# Patient Record
Sex: FEMALE | Race: BLACK OR AFRICAN AMERICAN | Employment: UNEMPLOYED | URBAN - METROPOLITAN AREA
[De-identification: names, ages, dates, MRNs, and addresses within clinical notes are randomized per-mention and may not be internally consistent; named-entity substitution may affect disease eponyms.]

---

## 2023-01-31 ENCOUNTER — APPOINTMENT (OUTPATIENT)
Dept: GENERAL RADIOLOGY | Age: 38
End: 2023-01-31

## 2023-01-31 ENCOUNTER — HOSPITAL ENCOUNTER (EMERGENCY)
Age: 38
Discharge: HOME | End: 2023-01-31
Attending: EMERGENCY MEDICINE

## 2023-01-31 VITALS
DIASTOLIC BLOOD PRESSURE: 79 MMHG | OXYGEN SATURATION: 100 % | SYSTOLIC BLOOD PRESSURE: 112 MMHG | WEIGHT: 168 LBS | TEMPERATURE: 98.1 F | HEART RATE: 78 BPM | RESPIRATION RATE: 25 BRPM

## 2023-01-31 DIAGNOSIS — R07.89 CHEST WALL PAIN: Primary | ICD-10-CM

## 2023-01-31 DIAGNOSIS — E87.6 HYPOKALEMIA: ICD-10-CM

## 2023-01-31 DIAGNOSIS — M62.838 SPASM OF MUSCLE: ICD-10-CM

## 2023-01-31 LAB
ALBUMIN SERPL-MCNC: 3.5 G/DL (ref 3.5–5)
ALBUMIN/GLOB SERPL: 0.9 (ref 0.4–1.6)
ALP SERPL-CCNC: 61 U/L (ref 50–130)
ALT SERPL-CCNC: 29 U/L (ref 12–65)
ANION GAP SERPL CALC-SCNC: 4 MMOL/L (ref 2–11)
AST SERPL-CCNC: 19 U/L (ref 15–37)
BILIRUB SERPL-MCNC: 0.3 MG/DL (ref 0.2–1.1)
BUN SERPL-MCNC: 17 MG/DL (ref 6–23)
CALCIUM SERPL-MCNC: 9.3 MG/DL (ref 8.3–10.4)
CHLORIDE SERPL-SCNC: 106 MMOL/L (ref 101–110)
CO2 SERPL-SCNC: 27 MMOL/L (ref 21–32)
CREAT SERPL-MCNC: 0.69 MG/DL (ref 0.6–1)
ERYTHROCYTE [DISTWIDTH] IN BLOOD BY AUTOMATED COUNT: 12.7 % (ref 11.9–14.6)
GLOBULIN SER CALC-MCNC: 3.9 G/DL (ref 2.8–4.5)
GLUCOSE SERPL-MCNC: 97 MG/DL (ref 65–100)
HCT VFR BLD AUTO: 41.7 % (ref 35.8–46.3)
HGB BLD-MCNC: 13.9 G/DL (ref 11.7–15.4)
MCH RBC QN AUTO: 28.8 PG (ref 26.1–32.9)
MCHC RBC AUTO-ENTMCNC: 33.3 G/DL (ref 31.4–35)
MCV RBC AUTO: 86.5 FL (ref 82–102)
NRBC # BLD: 0 K/UL (ref 0–0.2)
PLATELET # BLD AUTO: 305 K/UL (ref 150–450)
PMV BLD AUTO: 10.9 FL (ref 9.4–12.3)
POTASSIUM SERPL-SCNC: 3.3 MMOL/L (ref 3.5–5.1)
PROT SERPL-MCNC: 7.4 G/DL (ref 6.3–8.2)
RBC # BLD AUTO: 4.82 M/UL (ref 4.05–5.2)
SODIUM SERPL-SCNC: 137 MMOL/L (ref 133–143)
TROPONIN I SERPL HS-MCNC: 3.3 PG/ML (ref 0–14)
TROPONIN I SERPL HS-MCNC: 3.5 PG/ML (ref 0–14)
WBC # BLD AUTO: 8.9 K/UL (ref 4.3–11.1)

## 2023-01-31 PROCEDURE — 71046 X-RAY EXAM CHEST 2 VIEWS: CPT

## 2023-01-31 PROCEDURE — 94761 N-INVAS EAR/PLS OXIMETRY MLT: CPT

## 2023-01-31 PROCEDURE — 6370000000 HC RX 637 (ALT 250 FOR IP): Performed by: EMERGENCY MEDICINE

## 2023-01-31 PROCEDURE — 85027 COMPLETE CBC AUTOMATED: CPT

## 2023-01-31 PROCEDURE — 84484 ASSAY OF TROPONIN QUANT: CPT

## 2023-01-31 PROCEDURE — 6360000002 HC RX W HCPCS: Performed by: EMERGENCY MEDICINE

## 2023-01-31 PROCEDURE — 36415 COLL VENOUS BLD VENIPUNCTURE: CPT

## 2023-01-31 PROCEDURE — 99285 EMERGENCY DEPT VISIT HI MDM: CPT

## 2023-01-31 PROCEDURE — 80053 COMPREHEN METABOLIC PANEL: CPT

## 2023-01-31 PROCEDURE — 96374 THER/PROPH/DIAG INJ IV PUSH: CPT

## 2023-01-31 RX ORDER — CYCLOBENZAPRINE HCL 5 MG
5 TABLET ORAL NIGHTLY PRN
Qty: 10 TABLET | Refills: 0 | Status: SHIPPED | OUTPATIENT
Start: 2023-01-31 | End: 2023-02-10

## 2023-01-31 RX ORDER — POTASSIUM CHLORIDE 20 MEQ/1
20 TABLET, EXTENDED RELEASE ORAL ONCE
Status: COMPLETED | OUTPATIENT
Start: 2023-01-31 | End: 2023-01-31

## 2023-01-31 RX ORDER — KETOROLAC TROMETHAMINE 30 MG/ML
15 INJECTION, SOLUTION INTRAMUSCULAR; INTRAVENOUS EVERY 6 HOURS PRN
Status: DISCONTINUED | OUTPATIENT
Start: 2023-01-31 | End: 2023-01-31

## 2023-01-31 RX ORDER — MELOXICAM 15 MG/1
15 TABLET ORAL DAILY PRN
Qty: 30 TABLET | Refills: 0 | Status: SHIPPED | OUTPATIENT
Start: 2023-01-31

## 2023-01-31 RX ORDER — KETOROLAC TROMETHAMINE 30 MG/ML
15 INJECTION, SOLUTION INTRAMUSCULAR; INTRAVENOUS ONCE
Status: COMPLETED | OUTPATIENT
Start: 2023-01-31 | End: 2023-01-31

## 2023-01-31 RX ORDER — LIDOCAINE 50 MG/G
1 PATCH TOPICAL DAILY
Qty: 10 PATCH | Refills: 0 | Status: SHIPPED | OUTPATIENT
Start: 2023-01-31 | End: 2023-02-10

## 2023-01-31 RX ADMIN — KETOROLAC TROMETHAMINE 15 MG: 30 INJECTION, SOLUTION INTRAMUSCULAR; INTRAVENOUS at 07:44

## 2023-01-31 RX ADMIN — POTASSIUM CHLORIDE 20 MEQ: 1500 TABLET, EXTENDED RELEASE ORAL at 10:57

## 2023-01-31 ASSESSMENT — PAIN SCALES - GENERAL: PAINLEVEL_OUTOF10: 8

## 2023-01-31 ASSESSMENT — ENCOUNTER SYMPTOMS: CHEST TIGHTNESS: 1

## 2023-01-31 NOTE — DISCHARGE INSTRUCTIONS
Take the NSAID as prescribed with food for the next week then as needed. Do the stretching exercises like we discussed. Drink 1 to 2 L of water daily. [place] el PIERRE según lo recetado con alimentos aristeo la próxima semana y luego según sea necesario. Kwabena los ejercicios de estiramiento hubert discutimos. Beber de 1 a 2 L de agua al día. [place] el Flexeril por la noche antes de [place], según sea necesario. Use el parche de Lidoderm sobre el área afectada aristeo 12 horas y un mínimo de 12 horas antes de ponerse un parche nuevo en el cuerpo. Comience a glendy shaheen vitamina diaria shaheen vez al día aristeo al menos las próximas 2 semanas. Nos encantaría ayudarlo a obtener un médico de atención primaria para el seguimiento después de teresa visita al departamento de emergencias. Llame al [phone] [employer] 7 a. m. y las 6 p. m. de lunes a viernes. Un navegador de atención podrá ayudarlo a encontrar un médico cerca de teresa hogar. Take the Flexeril at nighttime before going to bed as needed. Use the Lidoderm patch over the affected area 12 hours on and a minimum of 12 hours off before putting a new patch on your body    Start taking a daily vitamin once a day for at least the next 2 weeks. We would love to help you get a primary care doctor for follow-up after your emergency department visit. Please call [phone] between 7AM - 6PM Monday to Friday. A care navigator will be able to assist you with setting up a doctor close to your home.

## 2023-01-31 NOTE — ED PROVIDER NOTES
Emergency Department Provider Note                   PCP:                None None               Age: 40 y.o. Sex: female       ICD-10-CM    1. Chest wall pain  R07.89       2. Spasm of muscle  M62.838       3. Hypokalemia  E87.6           DISPOSITION Decision To Discharge 01/31/2023 10:47:55 AM       Medical Decision Making  CHF, COPD, pneumonia, PE,    MI, coronary artery disease, unstable angina, coronary artery disease,    Atrial fibrillation, cardiac arrhythmia, PVC, medication induced palpitations, heart block,  electrolyte induced palpitations,    Aortic dissection, aortic aneurysm,    GERD, musculoskeletal pain, costochondritis, rib fracture, pleurisy,      Amount and/or Complexity of Data Reviewed  Labs: ordered. Decision-making details documented in ED Course. Radiology: ordered and independent interpretation performed. Decision-making details documented in ED Course. ECG/medicine tests: ordered and independent interpretation performed. Decision-making details documented in ED Course. Risk  Prescription drug management. Complexity of Problem: 1 stable, acute illness. (3)  Complexity of Problem: 1 undiagnosed new problem with uncertain prognosis. (4)    I have conducted an independent ordering and review of Labs. I have conducted an independent ordering and review of EKG. I have conducted an independent ordering and review of X-rays. Considerations: Shared decision making was utilized in the care of this patient. Considerations: The following labs and/or imaging studies were considered but not ordered: ct  Considerations: The following treatments were considered but not given: Rx such as antibiotics. Social determinant affecting care: Language barrier         ED Course as of 01/31/23 1051   Tue Jan 31, 2023   0821 XR CHEST (2 VW)  IMPRESSION:  No radiographic evidence of acute cardiopulmonary disease.  [VZ]   9306 Independent evaluation of the patient's x-ray and blood work was not performed here in the emergency department. I talked to the patient and her significant other about the findings in the emergency room and management of care. [KH]      ED Course User Index  [KH] Baldemar Cuello DO        Orders Placed This Encounter   Procedures    XR CHEST (2 VW)    CBC    Troponin    Comprehensive Metabolic Panel    Troponin    Cardiac Monitor    Pulse Oximetry    EKG 12 Lead    Saline lock IV        Medications   potassium chloride (KLOR-CON M) extended release tablet 20 mEq (has no administration in time range)   ketorolac (TORADOL) injection 15 mg (15 mg IntraVENous Given 1/31/23 0744)       New Prescriptions    CYCLOBENZAPRINE (FLEXERIL) 5 MG TABLET    Take 1 tablet by mouth nightly as needed for Muscle spasms    LIDOCAINE (LIDODERM) 5 %    Place 1 patch onto the skin daily for 10 days 12 hours on, 12 hours off. MELOXICAM (MOBIC) 15 MG TABLET    Take 1 tablet by mouth daily as needed for Pain        Carmita Escudero is a 40 y.o. female who presents to the Emergency Department with chief complaint of    Chief Complaint   Patient presents with    Chest Pain      52-year-old female presenting to the emerged department a complaining of chest pain. The patient says that started around 1:00 yesterday afternoon and has been constant since onset. She says the pain last for about 2 to 3 seconds at a time but comes on every 30 to 40 seconds. The patient notes that taking deep breath pushing on it or moving makes the pain worse. 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 She denies any trauma or injury. She denies any tobacco use. She says that she has some insomnia but otherwise no medical problems. She is not on medications currently and denies any known drug allergies. Patient has not had any productive cough fevers or chills. Patient is [place]-speaking and all information was obtained through the use of .          Review of Systems   Respiratory:  Positive for chest tightness. Cardiovascular:  Positive for chest pain. All other systems reviewed and are negative. No past medical history on file. No past surgical history on file. No family history on file. Social History     Socioeconomic History    Marital status: Single        Allergies: Patient has no known allergies. Previous Medications    No medications on file        Vitals signs and nursing note reviewed. Patient Vitals for the past 4 hrs:   Pulse Resp BP SpO2   01/31/23 0804 64 14 106/73 100 %   01/31/23 0744 62 -- 115/78 99 %   01/31/23 0715 73 14 127/75 100 %   01/31/23 0714 68 21 -- 97 %          Physical Exam     GENERAL:The patient has There is no height or weight on file to calculate BMI. Well-hydrated. VITAL SIGNS: Heart rate, blood pressure, respiratory rate reviewed as recorded in  nurse's notes  EYES: Pupils reactive. Extraocular motion intact. No conjunctival redness or drainage. MOUTH/THROAT: Pharynx clear; airway patent. NECK: Supple, no meningeal signs. Trachea midline. No masses or thyromegaly. LUNGS: Breath sounds clear and equal bilaterally no accessory muscle use. CHEST: No deformity or crepitus appreciated. Reproducible tenderness palpation in the mid clavicular line at the ninth and 10th ribs on the left. No rash noted. CARDIOVASCULAR: Regular rate and rhythm  ABDOMEN: Soft without tenderness. No palpable masses or organomegaly. No  peritoneal signs. No rigidity. EXTREMITIES: No clubbing or cyanosis. No joint swelling. Normal muscle tone. No  restricted range of motion appreciated. NEUROLOGIC: Sensation is grossly intact. Cranial nerve exam reveals face is  symmetrical, tongue is midline speech is clear. SKIN: No rash or petechiae. Good skin turgor palpated. PSYCHIATRIC: Alert and oriented. Appropriate behavior and judgment.       Procedures    ED EKG Interpretation  EKG was interpreted in the absence of a cardiologist.    Rate: 69  EKG Interpretation: EKG Interpretation: sinus rhythm  ST Segments: Normal ST segments - NO STEMI    Limited bedside musculoskeletal ultrasound was performed in the emergency department. This shows thickening of the muscle over the levator scapula and upper trap on the left sternal rib no cystic formation present. Results for orders placed or performed during the hospital encounter of 01/31/23   XR CHEST (2 VW)    Narrative    EXAM: CHEST X-RAY, 2 VIEWS    INDICATION: Chest pain    COMPARISON: None available    TECHNIQUE: Frontal and lateral views of the chest were obtained. FINDINGS:    Lungs and pleural spaces: Normal. No pneumothorax or pleural effusion. Cardiomediastinal silhouette: Normal.    Osseous structures: Normal.        Impression    No radiographic evidence of acute cardiopulmonary disease.    CBC   Result Value Ref Range    WBC 8.9 4.3 - 11.1 K/uL    RBC 4.82 4.05 - 5.2 M/uL    Hemoglobin 13.9 11.7 - 15.4 g/dL    Hematocrit 41.7 35.8 - 46.3 %    MCV 86.5 82.0 - 102.0 FL    MCH 28.8 26.1 - 32.9 PG    MCHC 33.3 31.4 - 35.0 g/dL    RDW 12.7 11.9 - 14.6 %    Platelets 015 636 - 650 K/uL    MPV 10.9 9.4 - 12.3 FL    nRBC 0.00 0.0 - 0.2 K/uL   Troponin   Result Value Ref Range    Troponin, High Sensitivity 3.3 0 - 14 pg/mL   Comprehensive Metabolic Panel   Result Value Ref Range    Sodium 137 133 - 143 mmol/L    Potassium 3.3 (L) 3.5 - 5.1 mmol/L    Chloride 106 101 - 110 mmol/L    CO2 27 21 - 32 mmol/L    Anion Gap 4 2 - 11 mmol/L    Glucose 97 65 - 100 mg/dL    BUN 17 6 - 23 MG/DL    Creatinine 0.69 0.6 - 1.0 MG/DL    Est, Glom Filt Rate >60 >60 ml/min/1.73m2    Calcium 9.3 8.3 - 10.4 MG/DL    Total Bilirubin 0.3 0.2 - 1.1 MG/DL    ALT 29 12 - 65 U/L    AST 19 15 - 37 U/L    Alk Phosphatase 61 50 - 130 U/L    Total Protein 7.4 6.3 - 8.2 g/dL    Albumin 3.5 3.5 - 5.0 g/dL    Globulin 3.9 2.8 - 4.5 g/dL    Albumin/Globulin Ratio 0.9 0.4 - 1.6     Troponin   Result Value Ref Range    Troponin, High Sensitivity 3.5 0 - 14 pg/mL        XR CHEST (2 VW)   Final Result   No radiographic evidence of acute cardiopulmonary disease. Voice dictation software was used during the making of this note. This software is not perfect and grammatical and other typographical errors may be present. This note has not been completely proofread for errors.        Deena Grimaldo DO  01/31/23 1051

## 2023-01-31 NOTE — ED NOTES
I have reviewed discharge instructions with the patient. The patient verbalized understanding. Patient left ED via Discharge Method: ambulatory to Home with family. Opportunity for questions and clarification provided. Patient given 3 scripts. To continue your aftercare when you leave the hospital, you may receive an automated call from our care team to check in on how you are doing. This is a free service and part of our promise to provide the best care and service to meet your aftercare needs.  If you have questions, or wish to unsubscribe from this service please call [phone]. Thank you for Choosing our [facility] Emergency Department.         Jm Butler RN  01/31/23 9374

## 2023-01-31 NOTE — ED TRIAGE NOTES
Patient brought to ER via EMS from the [facility] parking lot. Patient c\o chest pain that began at 1 oclock yesterday. EMS  20 R Hand   324 Aspirin    119/82      Patient is Czech speaking.